# Patient Record
Sex: MALE | Race: WHITE | NOT HISPANIC OR LATINO | URBAN - METROPOLITAN AREA
[De-identification: names, ages, dates, MRNs, and addresses within clinical notes are randomized per-mention and may not be internally consistent; named-entity substitution may affect disease eponyms.]

---

## 2020-10-20 ENCOUNTER — OFFICE VISIT - HEALTHEAST (OUTPATIENT)
Dept: FAMILY MEDICINE | Facility: CLINIC | Age: 45
End: 2020-10-20

## 2020-10-20 DIAGNOSIS — L03.115 CELLULITIS OF RIGHT LOWER EXTREMITY: ICD-10-CM

## 2020-10-20 RX ORDER — IBUPROFEN 200 MG
200 TABLET ORAL EVERY 6 HOURS PRN
Status: SHIPPED | COMMUNITY
Start: 2020-10-20

## 2020-10-22 LAB
BACTERIA SPEC CULT: ABNORMAL
BACTERIA SPEC CULT: ABNORMAL

## 2021-06-05 VITALS
SYSTOLIC BLOOD PRESSURE: 165 MMHG | OXYGEN SATURATION: 98 % | HEART RATE: 89 BPM | RESPIRATION RATE: 16 BRPM | DIASTOLIC BLOOD PRESSURE: 87 MMHG | WEIGHT: 204.8 LBS | TEMPERATURE: 98.2 F

## 2021-06-18 NOTE — PATIENT INSTRUCTIONS - HE
Patient Instructions by Boy Arnold PA-C at 10/20/2020 11:20 AM     Author: Boy Arnold PA-C Service: -- Author Type: Physician Assistant    Filed: 10/20/2020 12:07 PM Encounter Date: 10/20/2020 Status: Signed    : Boy Arnold PA-C (Physician Assistant)       Take the antibiotic as written 3 times per day.  Use of over-the-counter probiotic and use of yogurt with active cultures to help prevent diarrhea.  Continue antibiotic if you develop stomach pain or diarrhea.  Follow-up with your primary care provider.  Hot packs to the area 3 times per day.  Return to see primary care provider if not getting 100% resolution after completion of the antibiotic or if new symptoms or concerns arise.      Discharge Instructions for Cellulitis  You have been diagnosed with cellulitis. This is an infection in the deepest layer of the skin. In some cases, the infection also affects the muscle. Cellulitis is caused by bacteria. The bacteria can enter the body through broken skin. This can happen with a cut, scratch, animal bite, or an insect bite that has been scratched. You may have been treated in the hospital with antibiotics and fluids. You will likely be given a prescription for antibiotics to take at home. This sheet will help you take care of yourself at home.  Home Care  When you are home:    Take the prescribed antibiotic medicine you are given as directed until it is gone. Take it even if you feel better. It treats the infection and stops it from returning. Not taking all the medicine can make future infections hard to treat.    Keep the infected area clean.    When possible, raise the infected area above the level of your heart. This helps keep swelling down.    Talk with your healthcare provider if you are in pain. Ask what kind of over-the-counter medicine you can take for pain.    Apply clean bandages as advised.    Take your temperature once a day for a week.    Wash your hands often to prevent spreading the  infection.  In the future, wash your hands before and after you touch cuts, scratches, or bandages. This will help prevent infection.   When to return for re-evaluation  Return immediately or be seen in the emergency room if you have any of the following:    Difficulty or pain when moving the joints above or below the infected area    Develop discharge or pus draining from the area    Fever of 100.4 F (38 C) or higher, or as directed by your healthcare provider    Pain that gets worse in or around the infected     Redness that gets worse in or around the infected area, particularly if the area of redness expands to a wider area    Shaking chills    Swelling of the infected area    Vomiting   Date Last Reviewed: 8/1/2016 2000-2016 [employer]. [address] [place] [address], [place] [address]. All rights reserved. This information is not intended as a substitute for professional medical care. Always follow your healthcare professional's instructions.

## 2021-06-29 NOTE — PROGRESS NOTES
Progress Notes by Boy Arnold PA-C at 10/20/2020 11:20 AM     Author: Boy Arnold PA-C Service: -- Author Type: Physician Assistant    Filed: 10/21/2020 10:30 AM Encounter Date: 10/20/2020 Status: Addendum    : Boy Arnold PA-C (Physician Assistant)    Related Notes: Original Note by Boy Arnold PA-C (Physician Assistant) filed at 10/21/2020 10:30 AM       Chief Complaint   Patient presents with   ? Rash     Worsening swelling, rash, and pain on R ankle x3 weeks       HPI:  Omi Eastman is a 45 y.o. male who presents today complaining of three week worsening history of right ankle rash.  Patient works as a  and also has Hockey skates that he thinks may have had irritation and subsequent contamination on the skin rash.  Patient states that he had a break in the skin and a rash that started on the right lateral ankle.  Had became itchy and had a small ulceration that became infected.  Patient now has redness swelling and some pustules that he would like to have evaluated for evaluation of potential infection peer he has no constitutional symptoms to include fever chills night sweats fatigue nausea vomiting or diarrhea.  He has not tried treatment for this at home.    History obtained from the patient.    Problem List:  There are no relevant problems documented for this patient.      No past medical history on file.    Social History     Tobacco Use   ? Smoking status: Former Smoker   ? Smokeless tobacco: Never Used   Substance Use Topics   ? Alcohol use: Not on file       Review of Systems  As above in HPI, otherwise balance of Review of Systems are negative.      Vitals:    10/20/20 1138   BP: 165/87   Patient Site: Right Arm   Patient Position: Sitting   Cuff Size: Adult Regular   Pulse: 89   Resp: 16   Temp: 98.2  F (36.8  C)   TempSrc: Oral   SpO2: 98%   Weight: 204 lb 12.8 oz (92.9 kg)       Physical Exam  General: Patient is resting comfortably no acute distress is afebrile  HEENT:  Head is normocephalic atraumatic   eyes are PERRL EOMI sclera anicteric   LUNGS: Clear to auscultation bilaterally  HEART: Regular rate and rhythm  Skin: With a 1.5 cm diameter flat nonfull-thickness ulceration that is healing there is a larger surrounding area of 5 cm that is erythematous and there are also fluid-filled pustules pustules are de-roofed and removed with an 11 blade and a swab was taken for culture.  Dry dressing was placed over the wounds.      Lab:  Wound culture is pending.    Clinical Decision Making:  Patient looks like he has a classic bacterial infection.  He will be treated with clindamycin to cover for both staph and strep with possible MRSA.  Culture will be obtained and he will be contacted if changes are necessary for the antibiotic treatment.  Otherwise wash the area once daily, keep clean dry and covered, and take antibiotic as written.  Use of probiotic to help prevent complications and diarrhea.  Indication for return and expected course of resolution was gone over and questions were answered to patient satisfaction before discharge.    At the end of the encounter, I discussed results, diagnosis, medications. Discussed red flags for immediate return to clinic/ER, as well as indications for follow up if no improvement. Patient understood and agreed to plan. Patient was stable for discharge.    1. Cellulitis of right lower extremity  Culture, Wound    clindamycin (CLEOCIN) 300 MG capsule         Patient Instructions     Take the antibiotic as written 3 times per day.  Use of over-the-counter probiotic and use of yogurt with active cultures to help prevent diarrhea.  Continue antibiotic if you develop stomach pain or diarrhea.  Follow-up with your primary care provider.  Hot packs to the area 3 times per day.  Return to see primary care provider if not getting 100% resolution after completion of the antibiotic or if new symptoms or concerns arise.      Discharge Instructions for  Cellulitis  You have been diagnosed with cellulitis. This is an infection in the deepest layer of the skin. In some cases, the infection also affects the muscle. Cellulitis is caused by bacteria. The bacteria can enter the body through broken skin. This can happen with a cut, scratch, animal bite, or an insect bite that has been scratched. You may have been treated in the hospital with antibiotics and fluids. You will likely be given a prescription for antibiotics to take at home. This sheet will help you take care of yourself at home.  Home Care  When you are home:    Take the prescribed antibiotic medicine you are given as directed until it is gone. Take it even if you feel better. It treats the infection and stops it from returning. Not taking all the medicine can make future infections hard to treat.    Keep the infected area clean.    When possible, raise the infected area above the level of your heart. This helps keep swelling down.    Talk with your healthcare provider if you are in pain. Ask what kind of over-the-counter medicine you can take for pain.    Apply clean bandages as advised.    Take your temperature once a day for a week.    Wash your hands often to prevent spreading the infection.  In the future, wash your hands before and after you touch cuts, scratches, or bandages. This will help prevent infection.   When to return for re-evaluation  Return immediately or be seen in the emergency room if you have any of the following:    Difficulty or pain when moving the joints above or below the infected area    Develop discharge or pus draining from the area    Fever of 100.4 F (38 C) or higher, or as directed by your healthcare provider    Pain that gets worse in or around the infected     Redness that gets worse in or around the infected area, particularly if the area of redness expands to a wider area    Shaking chills    Swelling of the infected area    Vomiting   Date Last Reviewed: 8/1/2016     6103-3032 [employer]. [address] [place] [address], [place] [address]. All rights reserved. This information is not intended as a substitute for professional medical care. Always follow your healthcare professional's instructions.

## 2025-01-06 ENCOUNTER — OFFICE VISIT (OUTPATIENT)
Dept: URGENT CARE | Facility: URGENT CARE | Age: 50
End: 2025-01-06
Payer: COMMERCIAL

## 2025-01-06 VITALS
DIASTOLIC BLOOD PRESSURE: 79 MMHG | HEART RATE: 67 BPM | RESPIRATION RATE: 20 BRPM | SYSTOLIC BLOOD PRESSURE: 170 MMHG | OXYGEN SATURATION: 97 % | TEMPERATURE: 98.2 F

## 2025-01-06 DIAGNOSIS — R05.2 SUBACUTE COUGH: Primary | ICD-10-CM

## 2025-01-06 DIAGNOSIS — R03.0 ELEVATED BLOOD PRESSURE READING WITHOUT DIAGNOSIS OF HYPERTENSION: ICD-10-CM

## 2025-01-06 PROCEDURE — 99203 OFFICE O/P NEW LOW 30 MIN: CPT | Performed by: FAMILY MEDICINE

## 2025-01-06 PROCEDURE — 87798 DETECT AGENT NOS DNA AMP: CPT | Performed by: FAMILY MEDICINE

## 2025-01-06 RX ORDER — ALBUTEROL SULFATE 90 UG/1
2 INHALANT RESPIRATORY (INHALATION) EVERY 4 HOURS PRN
Qty: 18 G | Refills: 0 | Status: SHIPPED | OUTPATIENT
Start: 2025-01-06

## 2025-01-06 RX ORDER — BENZONATATE 100 MG/1
100 CAPSULE ORAL 3 TIMES DAILY PRN
Qty: 30 CAPSULE | Refills: 0 | Status: SHIPPED | OUTPATIENT
Start: 2025-01-06

## 2025-01-06 NOTE — PROGRESS NOTES
Omi was seen today for cough.    Diagnoses and all orders for this visit:    Subacute cough  -     B. pertussis/parapertussis PCR-NP  -     benzonatate (TESSALON) 100 MG capsule; Take 1 capsule (100 mg) by mouth 3 times daily as needed for cough.  -     albuterol (PROAIR HFA/PROVENTIL HFA/VENTOLIN HFA) 108 (90 Base) MCG/ACT inhaler; Inhale 2 puffs into the lungs every 4 hours as needed for shortness of breath, wheezing or cough.    Elevated blood pressure reading without diagnosis of hypertension    Schedule a physical to discuss blood pressure with provider.      Subjective   Omi Eastman is a 49 year old is presenting for the following health issues:  Since berenice Cough/wheezing/headache NEG covid test at home       HPI : Omi Eastman is here with nagging cough. Cough comes and goes. Worse at night.  He does not feel ill otherwise.  No fevers.  Occasional chills. Appetite is down.    He felt poorly Dec 24 - fatigued/ nausea but no diarrhea or cough then.  On Dec 26 was in bed all day with some congestion / fatigue.  Felt little better Dec 27.  On Dec 31 started to feel poorly again.  On Jan 1 started to cough more.  Has not been eating well.  No issues with sense of smell.  Had 2 negative COVID tests. Taking lot of cough drops / Nyquil and other cold and cough meds.    His father has been ill with prolonged cough.  Neighbors have been ill.  Daughter has had cough.  No asthma.    No travel.  Has been off work.  Works as  / volunteer fire department.  Has had elevated BP off and on but no meds/ treatment.      Review of Systems: Occasional feels a wheeze if coughs a lot.  No rhinorrhea/ sore throat / congestion.  No shortness of breath.  No chest pain.    There is no problem list on file for this patient.             Objective:  BP (!) 170/79   Pulse 67   Temp 98.2  F (36.8  C)   Resp 20   SpO2 97%  No acute distress.    HEENT: Head is atraumatic and/normocephalic.  PERRL.  Conjunctiva  clear.  Tympanic membranes grey with normal landmarks and normal light reflexes.  No nasal discharge.  Oropharynx is pink and moist.  Sinuses nontender.  Neck: Supple.  No lymphadenopathy or thyromegaly.  Lungs: Clear to auscultation.  No wheezing, retractions, or tachypnea.  Heart: RRR. S1 and S2 normal.  No murmurs, rubs, or gallops.  Neuro: Awake, alert, oriented x 3.  Normal strength and tone.  Normal gait.       No results found for this or any previous visit (from the past 24 hours).        Mraiela Cooper MD

## 2025-01-07 LAB
B PARAPERT DNA SPEC QL NAA+PROBE: NOT DETECTED
B PERT DNA SPEC QL NAA+PROBE: NOT DETECTED